# Patient Record
Sex: FEMALE | Race: WHITE
[De-identification: names, ages, dates, MRNs, and addresses within clinical notes are randomized per-mention and may not be internally consistent; named-entity substitution may affect disease eponyms.]

---

## 2019-07-12 ENCOUNTER — HOSPITAL ENCOUNTER (EMERGENCY)
Dept: HOSPITAL 26 - MED | Age: 19
Discharge: LEFT BEFORE DISCHARGE | End: 2019-07-12
Payer: SELF-PAY

## 2019-07-12 VITALS — DIASTOLIC BLOOD PRESSURE: 79 MMHG | SYSTOLIC BLOOD PRESSURE: 129 MMHG

## 2019-07-12 VITALS — HEIGHT: 63 IN | WEIGHT: 102 LBS | BODY MASS INDEX: 18.07 KG/M2

## 2019-07-12 DIAGNOSIS — R31.9: Primary | ICD-10-CM

## 2019-07-12 DIAGNOSIS — Z53.21: ICD-10-CM

## 2019-07-12 DIAGNOSIS — R30.9: ICD-10-CM

## 2024-08-03 NOTE — NUR
PT STATED THAT SHE WANTS TO SEE HER PCP AND DOES NOT WISH TO BE SEEN BY ER MD 
STATING SHE IS WORRIED ABOUT PRICE.
called pts name in er lobby. per admitting, pt using restroom at this time.
yes